# Patient Record
(demographics unavailable — no encounter records)

---

## 2025-05-06 NOTE — HISTORY OF PRESENT ILLNESS
[de-identified] : Date of Injury/Onset: over a few years Pain: At Rest:    Pain With Activity:    Mechanism of injury: had a cyst in her right ankle  This is NOT a Work Related Injury being treated under Worker's Compensation. This is NOT an athletic injury occurring associated with an interscholastic or organized sports team. Quality of symptoms: Constant aching/ sharp pain  Improves with: Resting and pain meds Worse with: Over exertion  Prior treatment: Saw podiatrist was told may need a procedure Prior Imaging: mentioned she had MRI and CT Reports Available For Review Today: Out of work/sport:  School/Sport/Position/Occupation: [employer] retired Personal goal: Additional Information:   05/06/2025 : JOSE is here today for Right ankle pain x several years. states she had a cyst that she has been dealing with for a few years and has been growing, has had arthroscopy (10 years ago, Dr. Dempsey) with great relief x 4 years. was seeing podiatrist who had her get MRI and CT but was suggested to get a bone graft done since the cyst continues to grow aggressively. Ct was done at [facility] also has CD and MRI was done in [place] (films only). Reports instability, weakness. Pain indicated to both medial/lateral aspect of right ankle, 7/10, intermittent, achy in nature. Worsening with walking, weight bearing.  Some relief with rest.  denies significant medial Hx.

## 2025-05-06 NOTE — IMAGING
[de-identified] : Patient ambulates with a Antalgic gait   Right Foot and Ankle: Inspection: Erythema: None Swelling: Diffuse swelling laterally, anteriorly, medially Ecchymosis: None Abrasions: None Rashes: None Surgical Scars: None Effusion: None Atrophy: None Deformity: None Pes [place] Valgus: Negative Pes Cavus: Negative Hallux Valgus: Negative Clawtoe/Hammertoe: Negative  Palpation: Crepitus: None Proximal Fibula: Nontender Distal Fibula: Nontender Medial Malleolus: tender Lateral Malleolus: tender AITFL: tender PITFL: Nontender ATFL: tender CFL: tender Deltoid: tender Calcaneus: Nontender Talar Head/Neck: Nontender Lateral Process of Talus: Nontender Anterior Facet of Calcaneus: Nontender Peroneal Tendons: tender Posterior Tibialis: Nontender Achilles Tendon: Nontender & Intact Achilles Insertion: Nontender Retrocalcaneal Bursa: Nontender Anterior Capsule: tender Subtalar Joint: Nontender Talonavicular Joint: Nontender Calcaneocuboid Joint: Nontender Heel pad: Nontender Medial Tubercle of Calcaneus: Nontender Plantar Fascia: Nontender Midfoot: Nontender Forefoot: Nontender Sesamoids: Nontender  ROM: Ankle Dorsiflexion: 0 degrees Ankle Plantar Flexion: 35 degrees Eversion: 15 degrees Inversion: 25 degrees, painful  Motor: Dorsiflexion: 5 out of 5 Plantar Flexion: 5 out of 5 Inversion: 5  out of 5 Eversion: 5 out of 5, painful  Provocative Testing: Anterior Drawer: Positive Syndesmosis Squeeze Test: Negative Circumduction test: Negative Resisted ER: Painful  Axial Grind 1st MTP: Painless Neurologic Exam: L4-S1 Sensation: Grossly Intact Tinels: Negative  Vascular Exam/Pulses: Dorsalis Pedis: 2+ Posterior Tibialis: 2+ Capillary Refill: <2 Seconds Other Exams: None Pertinent Contralateral Findings: None

## 2025-05-06 NOTE — DATA REVIEWED
[FreeTextEntry1] : 3 v r ankle neg for fx or dislocation medial dome cyst noted  ct scan jazmine 1/13/2025 impression: large chronic osteochondroma oif the medial talar dome. no evidence of unstable osteochondral flap or fragment

## 2025-05-06 NOTE — DISCUSSION/SUMMARY
[de-identified] : 59 yo F with large symptomatic talus cyst with a chronic hx of prior sx, discussion of bulk allograft, pre-op eval by outside ortho here for another opinion Discussed non-op tx options mobic, aso, PT as pt is not in any bracing right now possible CSI Surgical options include scope with bone grafting, v bulk allograft, v total talus as last resort discussed with pt I would start with non-operative tx and if no improvement consider ankle scope, debridement, bone grafting possible medial mal osteotomy Pt has a hx of inflammatory arthropathy  next visit: discuss what immunologic she is on